# Patient Record
Sex: MALE | Race: WHITE
[De-identification: names, ages, dates, MRNs, and addresses within clinical notes are randomized per-mention and may not be internally consistent; named-entity substitution may affect disease eponyms.]

---

## 2018-03-27 ENCOUNTER — HOSPITAL ENCOUNTER (EMERGENCY)
Dept: HOSPITAL 84 - D.ER | Age: 43
Discharge: HOME | End: 2018-03-27
Payer: MEDICARE

## 2018-03-27 VITALS — BODY MASS INDEX: 32.2 KG/M2

## 2018-03-27 DIAGNOSIS — K21.9: ICD-10-CM

## 2018-03-27 DIAGNOSIS — R10.13: Primary | ICD-10-CM

## 2018-03-27 DIAGNOSIS — E11.9: ICD-10-CM

## 2018-03-27 LAB
ALBUMIN SERPL-MCNC: 3.9 G/DL (ref 3.4–5)
ALP SERPL-CCNC: 75 U/L (ref 46–116)
ALT SERPL-CCNC: 60 U/L (ref 10–68)
AMYLASE SERPL-CCNC: 60 U/L (ref 25–115)
ANION GAP SERPL CALC-SCNC: 14.5 MMOL/L (ref 8–16)
APPEARANCE UR: CLEAR
BASOPHILS NFR BLD AUTO: 0.8 % (ref 0–2)
BILIRUB SERPL-MCNC: 0.46 MG/DL (ref 0.2–1.3)
BILIRUB SERPL-MCNC: NEGATIVE MG/DL
BUN SERPL-MCNC: 12 MG/DL (ref 7–18)
CALCIUM SERPL-MCNC: 9.2 MG/DL (ref 8.5–10.1)
CHLORIDE SERPL-SCNC: 105 MMOL/L (ref 98–107)
CO2 SERPL-SCNC: 25.8 MMOL/L (ref 21–32)
COLOR UR: YELLOW
CREAT SERPL-MCNC: 0.9 MG/DL (ref 0.6–1.3)
EOSINOPHIL NFR BLD: 3.6 % (ref 0–7)
ERYTHROCYTE [DISTWIDTH] IN BLOOD BY AUTOMATED COUNT: 13 % (ref 11.5–14.5)
GLOBULIN SER-MCNC: 4 G/L
GLUCOSE SERPL-MCNC: 130 MG/DL (ref 74–106)
GLUCOSE SERPL-MCNC: NEGATIVE MG/DL
HCT VFR BLD CALC: 46.7 % (ref 42–54)
HGB BLD-MCNC: 16.4 G/DL (ref 13.5–17.5)
IMM GRANULOCYTES NFR BLD: 0.3 % (ref 0–5)
KETONES UR STRIP-MCNC: NEGATIVE MG/DL
LIPASE SERPL-CCNC: 275 U/L (ref 73–393)
LYMPHOCYTES NFR BLD AUTO: 28 % (ref 15–50)
MCH RBC QN AUTO: 29.4 PG (ref 26–34)
MCHC RBC AUTO-ENTMCNC: 35.1 G/DL (ref 31–37)
MCV RBC: 83.8 FL (ref 80–100)
MONOCYTES NFR BLD: 9.7 % (ref 2–11)
NEUTROPHILS NFR BLD AUTO: 57.6 % (ref 40–80)
NITRITE UR-MCNC: NEGATIVE MG/ML
OSMOLALITY SERPL CALC.SUM OF ELEC: 282 MOSM/KG (ref 275–300)
PH UR STRIP: 8 [PH] (ref 5–6)
PLATELET # BLD: 245 10X3/UL (ref 130–400)
PMV BLD AUTO: 10.6 FL (ref 7.4–10.4)
POTASSIUM SERPL-SCNC: 4.3 MMOL/L (ref 3.5–5.1)
PROT SERPL-MCNC: 7.9 G/DL (ref 6.4–8.2)
PROT UR-MCNC: NEGATIVE MG/DL
RBC # BLD AUTO: 5.57 10X6/UL (ref 4.2–6.1)
SODIUM SERPL-SCNC: 141 MMOL/L (ref 136–145)
SP GR UR STRIP: 1 (ref 1–1.02)
UROBILINOGEN UR-MCNC: NORMAL MG/DL
WBC # BLD AUTO: 11.5 10X3/UL (ref 4.8–10.8)

## 2018-11-30 ENCOUNTER — HOSPITAL ENCOUNTER (OUTPATIENT)
Dept: HOSPITAL 84 - D.MRI | Age: 43
Discharge: HOME | End: 2018-11-30
Attending: PAIN MEDICINE
Payer: MEDICARE

## 2018-11-30 VITALS — BODY MASS INDEX: 32.2 KG/M2

## 2018-11-30 DIAGNOSIS — M96.1: ICD-10-CM

## 2018-11-30 DIAGNOSIS — Z79.891: ICD-10-CM

## 2018-11-30 DIAGNOSIS — Z79.899: ICD-10-CM

## 2018-11-30 DIAGNOSIS — M47.897: ICD-10-CM

## 2018-11-30 DIAGNOSIS — M54.5: Primary | ICD-10-CM

## 2018-11-30 DIAGNOSIS — M54.17: ICD-10-CM

## 2019-04-07 ENCOUNTER — HOSPITAL ENCOUNTER (EMERGENCY)
Dept: HOSPITAL 84 - D.ER | Age: 44
Discharge: LEFT BEFORE DISCHARGE | End: 2019-04-07
Payer: MEDICARE

## 2019-04-07 VITALS
BODY MASS INDEX: 32.65 KG/M2 | HEIGHT: 69 IN | HEIGHT: 69 IN | WEIGHT: 220.46 LBS | WEIGHT: 220.46 LBS | BODY MASS INDEX: 32.65 KG/M2

## 2019-04-07 VITALS — DIASTOLIC BLOOD PRESSURE: 79 MMHG | SYSTOLIC BLOOD PRESSURE: 128 MMHG

## 2019-04-07 DIAGNOSIS — R10.13: Primary | ICD-10-CM

## 2019-04-07 DIAGNOSIS — E11.9: ICD-10-CM

## 2019-04-07 DIAGNOSIS — K21.9: ICD-10-CM

## 2019-04-07 LAB
ALBUMIN SERPL-MCNC: 3.8 G/DL (ref 3.4–5)
ALP SERPL-CCNC: 76 U/L (ref 46–116)
ALT SERPL-CCNC: 76 U/L (ref 10–68)
AMYLASE SERPL-CCNC: 40 U/L (ref 25–115)
ANION GAP SERPL CALC-SCNC: 15.7 MMOL/L (ref 8–16)
APPEARANCE UR: CLEAR
BASOPHILS NFR BLD AUTO: 0.5 % (ref 0–2)
BILIRUB SERPL-MCNC: 0.34 MG/DL (ref 0.2–1.3)
BILIRUB SERPL-MCNC: NEGATIVE MG/DL
BUN SERPL-MCNC: 12 MG/DL (ref 7–18)
CALCIUM SERPL-MCNC: 9.1 MG/DL (ref 8.5–10.1)
CHLORIDE SERPL-SCNC: 102 MMOL/L (ref 98–107)
CO2 SERPL-SCNC: 23.1 MMOL/L (ref 21–32)
COLOR UR: YELLOW
CREAT SERPL-MCNC: 1 MG/DL (ref 0.6–1.3)
EOSINOPHIL NFR BLD: 1 % (ref 0–7)
ERYTHROCYTE [DISTWIDTH] IN BLOOD BY AUTOMATED COUNT: 13.1 % (ref 11.5–14.5)
GLOBULIN SER-MCNC: 4.2 G/L
GLUCOSE SERPL-MCNC: 115 MG/DL (ref 74–106)
GLUCOSE SERPL-MCNC: NEGATIVE MG/DL
HCT VFR BLD CALC: 46 % (ref 42–54)
HGB BLD-MCNC: 16.5 G/DL (ref 13.5–17.5)
IMM GRANULOCYTES NFR BLD: 0.3 % (ref 0–5)
KETONES UR STRIP-MCNC: NEGATIVE MG/DL
LIPASE SERPL-CCNC: 228 U/L (ref 73–393)
LYMPHOCYTES NFR BLD AUTO: 20.4 % (ref 15–50)
MCH RBC QN AUTO: 30.2 PG (ref 26–34)
MCHC RBC AUTO-ENTMCNC: 35.9 G/DL (ref 31–37)
MCV RBC: 84.1 FL (ref 80–100)
MONOCYTES NFR BLD: 9.2 % (ref 2–11)
NEUTROPHILS NFR BLD AUTO: 68.6 % (ref 40–80)
NITRITE UR-MCNC: NEGATIVE MG/ML
OSMOLALITY SERPL CALC.SUM OF ELEC: 274 MOSM/KG (ref 275–300)
PH UR STRIP: 6 [PH] (ref 5–6)
PLATELET # BLD: 244 10X3/UL (ref 130–400)
PMV BLD AUTO: 10.8 FL (ref 7.4–10.4)
POTASSIUM SERPL-SCNC: 3.8 MMOL/L (ref 3.5–5.1)
PROT SERPL-MCNC: 8 G/DL (ref 6.4–8.2)
PROT UR-MCNC: (no result) MG/DL
RBC # BLD AUTO: 5.47 10X6/UL (ref 4.2–6.1)
SODIUM SERPL-SCNC: 137 MMOL/L (ref 136–145)
SP GR UR STRIP: 1.01 (ref 1–1.02)
UROBILINOGEN UR-MCNC: NORMAL MG/DL
WBC # BLD AUTO: 15.6 10X3/UL (ref 4.8–10.8)

## 2019-08-22 ENCOUNTER — HOSPITAL ENCOUNTER (OUTPATIENT)
Dept: HOSPITAL 84 - D.CT | Age: 44
Discharge: HOME | End: 2019-08-22
Attending: ORTHOPAEDIC SURGERY
Payer: MEDICARE

## 2019-08-22 VITALS — BODY MASS INDEX: 32.5 KG/M2

## 2019-08-22 DIAGNOSIS — M25.551: Primary | ICD-10-CM

## 2020-05-27 ENCOUNTER — HOSPITAL ENCOUNTER (OUTPATIENT)
Dept: HOSPITAL 84 - D.ER | Age: 45
Setting detail: OBSERVATION
Discharge: HOME | End: 2020-05-27
Attending: FAMILY MEDICINE | Admitting: FAMILY MEDICINE
Payer: MEDICARE

## 2020-05-27 VITALS
WEIGHT: 216.45 LBS | BODY MASS INDEX: 32.06 KG/M2 | BODY MASS INDEX: 32.06 KG/M2 | HEIGHT: 69 IN | HEIGHT: 69 IN | WEIGHT: 216.45 LBS

## 2020-05-27 VITALS — SYSTOLIC BLOOD PRESSURE: 119 MMHG | DIASTOLIC BLOOD PRESSURE: 74 MMHG

## 2020-05-27 VITALS — DIASTOLIC BLOOD PRESSURE: 79 MMHG | SYSTOLIC BLOOD PRESSURE: 132 MMHG

## 2020-05-27 VITALS — SYSTOLIC BLOOD PRESSURE: 105 MMHG | DIASTOLIC BLOOD PRESSURE: 54 MMHG

## 2020-05-27 DIAGNOSIS — F17.203: ICD-10-CM

## 2020-05-27 DIAGNOSIS — E87.6: ICD-10-CM

## 2020-05-27 DIAGNOSIS — I20.0: Primary | ICD-10-CM

## 2020-05-27 DIAGNOSIS — K21.9: ICD-10-CM

## 2020-05-27 DIAGNOSIS — E78.5: ICD-10-CM

## 2020-05-27 DIAGNOSIS — I24.9: ICD-10-CM

## 2020-05-27 DIAGNOSIS — R42: ICD-10-CM

## 2020-05-27 DIAGNOSIS — R55: ICD-10-CM

## 2020-05-27 DIAGNOSIS — E11.65: ICD-10-CM

## 2020-05-27 DIAGNOSIS — E87.1: ICD-10-CM

## 2020-05-27 DIAGNOSIS — G89.29: ICD-10-CM

## 2020-05-27 DIAGNOSIS — I10: ICD-10-CM

## 2020-05-27 LAB
ALBUMIN SERPL-MCNC: 3.5 G/DL (ref 3.4–5)
ALP SERPL-CCNC: 63 U/L (ref 30–120)
ALT SERPL-CCNC: 75 U/L (ref 10–68)
ALT SERPL-CCNC: 81 U/L (ref 10–68)
ANION GAP SERPL CALC-SCNC: 12.6 MMOL/L (ref 8–16)
ANION GAP SERPL CALC-SCNC: 15.3 MMOL/L (ref 8–16)
APTT BLD: 25.8 SECONDS (ref 22.8–39.4)
BASOPHILS NFR BLD AUTO: 0.4 % (ref 0–2)
BASOPHILS NFR BLD AUTO: 0.5 % (ref 0–2)
BILIRUB SERPL-MCNC: 0.24 MG/DL (ref 0.2–1.3)
BUN SERPL-MCNC: 11 MG/DL (ref 7–18)
BUN SERPL-MCNC: 12 MG/DL (ref 7–18)
CALCIUM SERPL-MCNC: 8.8 MG/DL (ref 8.5–10.1)
CALCIUM SERPL-MCNC: 8.9 MG/DL (ref 8.5–10.1)
CHLORIDE SERPL-SCNC: 100 MMOL/L (ref 98–107)
CHLORIDE SERPL-SCNC: 104 MMOL/L (ref 98–107)
CHOLEST/HDLC SERPL: 7.9 RATIO (ref 2.3–4.9)
CK MB SERPL-MCNC: 0.2 U/L (ref 0–3.6)
CK MB SERPL-MCNC: 0.2 U/L (ref 0–3.6)
CK SERPL-CCNC: 32 UL (ref 21–232)
CK SERPL-CCNC: 33 UL (ref 21–232)
CO2 SERPL-SCNC: 23 MMOL/L (ref 21–32)
CO2 SERPL-SCNC: 26 MMOL/L (ref 21–32)
CREAT SERPL-MCNC: 0.8 MG/DL (ref 0.6–1.3)
CREAT SERPL-MCNC: 1 MG/DL (ref 0.6–1.3)
EOSINOPHIL NFR BLD: 1.4 % (ref 0–7)
EOSINOPHIL NFR BLD: 2.1 % (ref 0–7)
ERYTHROCYTE [DISTWIDTH] IN BLOOD BY AUTOMATED COUNT: 12.9 % (ref 11.5–14.5)
ERYTHROCYTE [DISTWIDTH] IN BLOOD BY AUTOMATED COUNT: 12.9 % (ref 11.5–14.5)
GLOBULIN SER-MCNC: 3.6 G/L
GLUCOSE SERPL-MCNC: 167 MG/DL (ref 74–106)
GLUCOSE SERPL-MCNC: 223 MG/DL (ref 74–106)
HCT VFR BLD CALC: 46.3 % (ref 42–54)
HCT VFR BLD CALC: 46.4 % (ref 42–54)
HDLC SERPL-MCNC: 27 MG/DL (ref 32–96)
HGB BLD-MCNC: 16 G/DL (ref 13.5–17.5)
HGB BLD-MCNC: 16.1 G/DL (ref 13.5–17.5)
IMM GRANULOCYTES NFR BLD: 0.6 % (ref 0–5)
IMM GRANULOCYTES NFR BLD: 0.8 % (ref 0–5)
INR PPP: 0.93 (ref 0.85–1.17)
LDL-HDL RATIO: 4.4 RATIO (ref 1.5–3.5)
LDLC SERPL-MCNC: 120 MG/DL (ref 0–100)
LYMPHOCYTES NFR BLD AUTO: 23.6 % (ref 15–50)
LYMPHOCYTES NFR BLD AUTO: 31 % (ref 15–50)
MAGNESIUM SERPL-MCNC: 1.7 MG/DL (ref 1.8–2.4)
MAGNESIUM SERPL-MCNC: 1.9 MG/DL (ref 1.8–2.4)
MCH RBC QN AUTO: 29.7 PG (ref 26–34)
MCH RBC QN AUTO: 29.8 PG (ref 26–34)
MCHC RBC AUTO-ENTMCNC: 34.6 G/DL (ref 31–37)
MCHC RBC AUTO-ENTMCNC: 34.7 G/DL (ref 31–37)
MCV RBC: 85.8 FL (ref 80–100)
MCV RBC: 86.1 FL (ref 80–100)
MONOCYTES NFR BLD: 7.3 % (ref 2–11)
MONOCYTES NFR BLD: 8.1 % (ref 2–11)
NEUTROPHILS NFR BLD AUTO: 57.5 % (ref 40–80)
NEUTROPHILS NFR BLD AUTO: 66.7 % (ref 40–80)
OSMOLALITY SERPL CALC.SUM OF ELEC: 275 MOSM/KG (ref 275–300)
OSMOLALITY SERPL CALC.SUM OF ELEC: 281 MOSM/KG (ref 275–300)
PLATELET # BLD: 226 10X3/UL (ref 130–400)
PLATELET # BLD: 245 10X3/UL (ref 130–400)
PMV BLD AUTO: 10.6 FL (ref 7.4–10.4)
PMV BLD AUTO: 10.8 FL (ref 7.4–10.4)
POTASSIUM SERPL-SCNC: 3.3 MMOL/L (ref 3.5–5.1)
POTASSIUM SERPL-SCNC: 3.6 MMOL/L (ref 3.5–5.1)
PROT SERPL-MCNC: 7.1 G/DL (ref 6.4–8.2)
PROTHROMBIN TIME: 12.4 SECONDS (ref 11.6–15)
RBC # BLD AUTO: 5.38 10X6/UL (ref 4.2–6.1)
RBC # BLD AUTO: 5.41 10X6/UL (ref 4.2–6.1)
SODIUM SERPL-SCNC: 135 MMOL/L (ref 136–145)
SODIUM SERPL-SCNC: 139 MMOL/L (ref 136–145)
TRIGL SERPL-MCNC: 332 MG/DL (ref 30–200)
TROPONIN I SERPL-MCNC: < 0.017 NG/ML (ref 0–0.06)
TROPONIN I SERPL-MCNC: < 0.017 NG/ML (ref 0–0.06)
WBC # BLD AUTO: 15 10X3/UL (ref 4.8–10.8)
WBC # BLD AUTO: 16.2 10X3/UL (ref 4.8–10.8)

## 2020-05-28 NOTE — EC
PATIENT:NAVEEN GUTIERREZ JR             DATE OF SERVICE: 05/27/20
SEX: M                                  MEDICAL RECORD: [id number]
DATE OF BIRTH: [birth year]                        LOCATION:D.M2      D.212
AGE OF PATIENT: 45                             ADMISSION DATE: 05/27/20
 
REFERRING PHYSICIAN:                               
 
INTERPRETING PHYSICIAN: CLARISSA PATTON MD          
 
 
 
                             ECHOCARDIOGRAM REPORT
  ECHO CHARGES 4               ECHO COMPLETE                 Date: 05/27/20
 
 
 
CLINICAL DIAGNOSIS: CP                            
 
                         ECHOCARDIOGRAPHIC MEASUREMENTS
      (adult normal given)
   AC root (d.<3.7cm) 2.6  cm   LV Septum d (<1.2 cm> 0.6  cm
      Valve Excursion 1.9  cm     LV Septum (systole) 0.7  cm
Left Atria (s.<4.0cm> 4.1  cm          LVPW d(<1.2cm) 0.7  cm
        RV (d.<2.3cm) 2.9  cm           LVPW (sytole) 1.0  cm
  LV diastole(<5.6CM) 6.1  cm       MV E-F(>70mm/sec)      cm
           LV systole 5.2  cm           LVOT Diameter 1.6  cm
       MV exc.(>10mm)      cm
Est.ejection fraction (50-75%)     %
 
   DOPPLER:
     LVIT      cm/sec A 46   cm/sec E 74    cm/sec
       LA      cm/sec      RVSP 15.3 mmHg
     LVOT 100  cm/sec   AOP1/2T      m/s
  Asc. Ao 130  cm/sec
     RVOT 89   cm/sec
       RA      cm/sec
       PA 97   cm/sec
 AV Gradient Peak 6.8  mmHg  AV Mean 3.7  mmHg  AV Area 1.5  cm
 MV Gradient Peak 2.4  mmHg  MV Mean 1.3  mmHg  MV Area      cm
   COMMENTS:                                              
 
 
 Cardiac Sonographer: 5               ABIODUN DOWNS             
      Cardiologist: 3          Dr. Woods             
             TAPE# PACS           
                                       Pericardial Effusion Y                        
 
 
DATE OF SERVICE:  
 
Adequate 2D, color flow imaging, spectral Doppler, and M-Mode.
 
No LVH.  LV internal dimension is normal.  Wall motion is normal.  EF is greater
than or equal to 55%.  Aortic valve is tricuspid.  No evidence of stenosis by
Doppler interrogation.  Left atrium is upper limits of normal and mildly dilated
at 4.1 cm.  Mitral valve shows no prolapse.  Trace MR.  Right-sided chambers are
grossly normal.  Trace TR.
 
 
 
 
ECHOCARDIOGRAM REPORT                          [id number]    NAVEEN GUTIERREZ      
 
 
TRANSINT:[id number] Voice Confirmation ID: [id number] DOCUMENT ID: [id number]
                                           
                                           CLARISSA PATTON MD          
 
 
 
Electronically Signed by CLARISSA PATTON on 05/28/20 at 0817
 
 
 
 
 
 
 
 
 
 
 
 
 
 
 
 
 
 
 
 
 
 
 
 
 
 
 
 
 
 
 
 
 
 
 
 
 
 
 
CC:                                                             [id number]
DICTATION DATE: 05/27/20 1225     :     05/27/20 1411      DIS IN  
                                                                      05/27/20
[id number] [place] [id number]

## 2020-05-28 NOTE — CN
PATIENT NAME:NAVEEN GUTIERREZ JR                           MEDICAL RECORD: [id number]
: 79                                              LOCATION:D. D.2120
ADMIT DATE: 20                                       ACCOUNT: [id number]
CONSULTING PHYSICIAN:    CLARISSA PATTON MD          
                                               
REFERRING PHYSICIAN:     RODRIGO SALGUERO MD           
 
 
DATE OF CONSULTATION:  2020
 
HISTORY OF PRESENT ILLNESS:  A 45-year-old gentleman with no known history of
coronary artery disease, he has a history of diabetes mellitus, hypertension,
hyperlipidemia, ongoing tobacco use, admitted with chest pain.  He has a history
of reflux, but this is somewhat different from his typical reflux, did not
respond to a GI cocktail, relieved with two nitroglycerin, noticed decreased
effort tolerance over the past 4-6 weeks.  Last night was first rest symptoms. 
We are asked to see him concerning his cardiovascular status.
 
PAST MEDICAL HISTORY:  Includes;
1.  History of hypertension.
2.  Hyperlipidemia.
3.  Diabetes mellitus.
4.  Anxiety.
 
ALLERGIES:  INCLUDE CONTRAST, INTOLERANT TO STATINS WITH MYALGIAS.
 
MEDICATIONS:  Typically include Norco 10/325 one p.o. every 4 hours p.r.n.,
Naprosyn 500 b.i.d., clonazepam 1 mg p.o. b.i.d., metformin 1 gram b.i.d.,
Januvia 50 mg p.o. daily, vitamin D 1000 units daily.
 
SOCIAL HISTORY:  He smokes about a pack a day, nondrinker, no illicit drug use. 
Rare exercise secondary to back issues.
 
REVIEW OF SYSTEMS:  The patient reports easy bruising but reports no swollen
glands. The patient reports no fever, no night sweats, no significant weight
gain, no significant weight loss.  No significant exercise tolerance.  The
patient reports no dry eyes, no irritation, no vision change.  Patient reports
no difficulty hearing and no ear pain.  Patient reports no frequent nose bleeds
or nose and sinus problems.  Patient reports on arm pain on exertion.   No
shortness of breath while lying down.  No history of heart murmur.  Patient
reports no cough, no wheezing or coughing up blood.  Patient reports no
abdominal pain, no vomiting.  Normal appetite.  No diarrhea and not vomiting
blood.  No nausea and no constipation.  Patient reports no incontinence.  No
difficulty urinating.  No hematuria.  No increased frequency.  Patient reports
no muscle aches.  No weakness, no arthralgias, no back pain.  No swelling of the
extremities.  Patient reports no abnormal mole, no jaundice, no rashes.  Reports
no loss of consciousness.  No weakness and no numbness.  No seizures, dizziness,
or headaches.  The patient reports no depression, no sleep disturbance, feeling
safe in a relationship and no alcohol abuse.  Patient reports on fatigue. 
Reports no runny nose or sinus pressure.  No itching, no hives, and no frequent
sneezing.
 
PHYSICAL EXAMINATION:
GENERAL:  Well-developed, in no acute distress.
VITAL SIGNS:  Blood pressure 123/20, pulse 87.
HEENT:  Normocephalic, atraumatic.
NECK:  No JVD or bruit.
HEART:  Regular.  A II/VI systolic ejection murmur.
 
 
 
CONSULT REPORT                                 [id number]    NAVEEN GUTIERREZ JR         
 
 
LUNGS:  Good air excursion.
ABDOMEN:  Soft, nontender.
EXTREMITIES:  Pulses 2+.  No edema.
 
DIAGNOSTIC DATA:  EKG shows minor nonspecific ST-T changes inferolaterally
probable voltage for LVH.
 
IMPRESSION:  Acute coronary syndrome, rapidly progressing symptomology, multiple
risk factors, angiography, intervention based on above.
 
TRANSINT:[id number] Voice Confirmation ID: [id number] DOCUMENT ID: [id number]
                                           
                                           CLARISSA PATTON MD          
 
 
 
Electronically Signed by CLARISSA PATTON on 20 at 0817
 
 
 
 
 
 
 
 
 
 
 
 
 
 
 
 
 
 
 
 
 
 
 
 
 
 
 
 
 
 
CC:                                                             [id number]
DICTATION DATE: 20     :     20 1612      DIS IN  
                                                                      20
[id number] [place] [address]

## 2020-06-10 ENCOUNTER — HOSPITAL ENCOUNTER (EMERGENCY)
Dept: HOSPITAL 84 - D.ER | Age: 45
LOS: 1 days | Discharge: HOME | End: 2020-06-11
Payer: MEDICARE

## 2020-06-10 VITALS — HEIGHT: 69 IN | BODY MASS INDEX: 33.39 KG/M2 | WEIGHT: 225.47 LBS

## 2020-06-10 DIAGNOSIS — R42: ICD-10-CM

## 2020-06-10 DIAGNOSIS — N17.9: ICD-10-CM

## 2020-06-10 DIAGNOSIS — F41.9: ICD-10-CM

## 2020-06-10 DIAGNOSIS — Z79.84: ICD-10-CM

## 2020-06-10 DIAGNOSIS — E11.9: ICD-10-CM

## 2020-06-10 DIAGNOSIS — K21.9: ICD-10-CM

## 2020-06-10 DIAGNOSIS — R41.0: Primary | ICD-10-CM

## 2020-06-10 DIAGNOSIS — D72.829: ICD-10-CM

## 2020-06-10 LAB
ALBUMIN SERPL-MCNC: 3.7 G/DL (ref 3.4–5)
ALP SERPL-CCNC: 66 U/L (ref 30–120)
ALT SERPL-CCNC: 85 U/L (ref 10–68)
AMPHETAMINES UR QL SCN: NEGATIVE QUAL
ANION GAP SERPL CALC-SCNC: 11.8 MMOL/L (ref 8–16)
APTT BLD: 28.5 SECONDS (ref 22.8–39.4)
BACTERIA #/AREA URNS HPF: (no result) /HPF
BARBITURATES UR QL SCN: NEGATIVE QUAL
BASOPHILS NFR BLD AUTO: 0.4 % (ref 0–2)
BENZODIAZ UR QL SCN: NEGATIVE QUAL
BILIRUB SERPL-MCNC: 0.35 MG/DL (ref 0.2–1.3)
BILIRUB SERPL-MCNC: NEGATIVE MG/DL
BUN SERPL-MCNC: 7 MG/DL (ref 7–18)
BZE UR QL SCN: NEGATIVE QUAL
CALCIUM SERPL-MCNC: 8.3 MG/DL (ref 8.5–10.1)
CANNABINOIDS UR QL SCN: NEGATIVE QUAL
CHLORIDE SERPL-SCNC: 104 MMOL/L (ref 98–107)
CK MB SERPL-MCNC: 0.3 U/L (ref 0–3.6)
CK SERPL-CCNC: 49 UL (ref 21–232)
CO2 SERPL-SCNC: 25.5 MMOL/L (ref 21–32)
CREAT SERPL-MCNC: 1.4 MG/DL (ref 0.6–1.3)
EOSINOPHIL NFR BLD: 2 % (ref 0–7)
ERYTHROCYTE [DISTWIDTH] IN BLOOD BY AUTOMATED COUNT: 12.6 % (ref 11.5–14.5)
GLOBULIN SER-MCNC: 3.6 G/L
GLUCOSE SERPL-MCNC: 159 MG/DL (ref 74–106)
GLUCOSE SERPL-MCNC: NEGATIVE MG/DL
HCT VFR BLD CALC: 46.5 % (ref 42–54)
HGB BLD-MCNC: 15.9 G/DL (ref 13.5–17.5)
IMM GRANULOCYTES NFR BLD: 0.3 % (ref 0–5)
INR PPP: 0.94 (ref 0.85–1.17)
KETONES UR STRIP-MCNC: NEGATIVE MG/DL
LYMPHOCYTES NFR BLD AUTO: 20.3 % (ref 15–50)
MAGNESIUM SERPL-MCNC: 1.8 MG/DL (ref 1.8–2.4)
MCH RBC QN AUTO: 30.3 PG (ref 26–34)
MCHC RBC AUTO-ENTMCNC: 34.2 G/DL (ref 31–37)
MCV RBC: 88.6 FL (ref 80–100)
MONOCYTES NFR BLD: 8.2 % (ref 2–11)
NEUTROPHILS NFR BLD AUTO: 68.8 % (ref 40–80)
NITRITE UR-MCNC: NEGATIVE MG/ML
OPIATES UR QL SCN: POSITIVE QUAL
OSMOLALITY SERPL CALC.SUM OF ELEC: 274 MOSM/KG (ref 275–300)
PCP UR QL SCN: NEGATIVE QUAL
PH UR STRIP: 7 [PH] (ref 5–6)
PLATELET # BLD: 241 10X3/UL (ref 130–400)
PMV BLD AUTO: 10.5 FL (ref 7.4–10.4)
POTASSIUM SERPL-SCNC: 4.3 MMOL/L (ref 3.5–5.1)
PROT SERPL-MCNC: 7.3 G/DL (ref 6.4–8.2)
PROTHROMBIN TIME: 12.5 SECONDS (ref 11.6–15)
RBC # BLD AUTO: 5.25 10X6/UL (ref 4.2–6.1)
RBC #/AREA URNS HPF: (no result) /HPF (ref 0–5)
SODIUM SERPL-SCNC: 137 MMOL/L (ref 136–145)
SP GR UR STRIP: 1.01 (ref 1–1.02)
SQUAMOUS #/AREA URNS HPF: (no result) /HPF (ref 0–5)
TROPONIN I SERPL-MCNC: < 0.017 NG/ML (ref 0–0.06)
TSH SERPL-ACNC: 2.08 UIU/ML (ref 0.36–3.74)
UROBILINOGEN UR-MCNC: NORMAL MG/DL
WBC # BLD AUTO: 14.6 10X3/UL (ref 4.8–10.8)
WBC #/AREA URNS HPF: (no result) /HPF

## 2020-06-11 ENCOUNTER — HOSPITAL ENCOUNTER (OUTPATIENT)
Dept: HOSPITAL 84 - D.ER | Age: 45
Setting detail: OBSERVATION
LOS: 1 days | Discharge: HOME | End: 2020-06-12
Attending: FAMILY MEDICINE | Admitting: FAMILY MEDICINE
Payer: MEDICARE

## 2020-06-11 VITALS — DIASTOLIC BLOOD PRESSURE: 70 MMHG | SYSTOLIC BLOOD PRESSURE: 129 MMHG

## 2020-06-11 VITALS — SYSTOLIC BLOOD PRESSURE: 119 MMHG | DIASTOLIC BLOOD PRESSURE: 74 MMHG

## 2020-06-11 VITALS — WEIGHT: 211.44 LBS | HEIGHT: 69 IN | BODY MASS INDEX: 31.32 KG/M2 | BODY MASS INDEX: 31.32 KG/M2

## 2020-06-11 VITALS — DIASTOLIC BLOOD PRESSURE: 74 MMHG | SYSTOLIC BLOOD PRESSURE: 119 MMHG

## 2020-06-11 DIAGNOSIS — E78.5: ICD-10-CM

## 2020-06-11 DIAGNOSIS — R20.2: ICD-10-CM

## 2020-06-11 DIAGNOSIS — G89.29: ICD-10-CM

## 2020-06-11 DIAGNOSIS — F17.203: ICD-10-CM

## 2020-06-11 DIAGNOSIS — I20.0: Primary | ICD-10-CM

## 2020-06-11 DIAGNOSIS — E11.65: ICD-10-CM

## 2020-06-11 DIAGNOSIS — K21.9: ICD-10-CM

## 2020-06-11 DIAGNOSIS — F41.8: ICD-10-CM

## 2020-06-11 LAB
ALBUMIN SERPL-MCNC: 3.9 G/DL (ref 3.4–5)
ALP SERPL-CCNC: 73 U/L (ref 30–120)
ALT SERPL-CCNC: 82 U/L (ref 10–68)
ANION GAP SERPL CALC-SCNC: 10 MMOL/L (ref 8–16)
APTT BLD: 28.6 SECONDS (ref 22.8–39.4)
BASOPHILS NFR BLD AUTO: 0.7 % (ref 0–2)
BILIRUB SERPL-MCNC: 0.43 MG/DL (ref 0.2–1.3)
BUN SERPL-MCNC: 6 MG/DL (ref 7–18)
CALCIUM SERPL-MCNC: 9.7 MG/DL (ref 8.5–10.1)
CHLORIDE SERPL-SCNC: 105 MMOL/L (ref 98–107)
CHOLEST/HDLC SERPL: 6.1 RATIO (ref 2.3–4.9)
CK MB SERPL-MCNC: 0.3 U/L (ref 0–3.6)
CK MB SERPL-MCNC: 0.6 U/L (ref 0–3.6)
CK SERPL-CCNC: 40 UL (ref 21–232)
CK SERPL-CCNC: 43 UL (ref 21–232)
CO2 SERPL-SCNC: 26.3 MMOL/L (ref 21–32)
CREAT SERPL-MCNC: 1 MG/DL (ref 0.6–1.3)
CRP SERPL-MCNC: 1.6 MG/DL (ref 0–0.9)
EOSINOPHIL NFR BLD: 1.3 % (ref 0–7)
ERYTHROCYTE [DISTWIDTH] IN BLOOD BY AUTOMATED COUNT: 12.4 % (ref 11.5–14.5)
GLOBULIN SER-MCNC: 3.6 G/L
GLUCOSE SERPL-MCNC: 134 MG/DL (ref 74–106)
HCT VFR BLD CALC: 47.6 % (ref 42–54)
HDLC SERPL-MCNC: 28 MG/DL (ref 32–96)
HGB BLD-MCNC: 16.3 G/DL (ref 13.5–17.5)
IMM GRANULOCYTES NFR BLD: 0.4 % (ref 0–5)
INR PPP: 0.96 (ref 0.85–1.17)
LDL-HDL RATIO: 2.9 RATIO (ref 1.5–3.5)
LDLC SERPL-MCNC: 80 MG/DL (ref 0–100)
LYMPHOCYTES NFR BLD AUTO: 23.8 % (ref 15–50)
MCH RBC QN AUTO: 29.8 PG (ref 26–34)
MCHC RBC AUTO-ENTMCNC: 34.2 G/DL (ref 31–37)
MCV RBC: 87 FL (ref 80–100)
MONOCYTES NFR BLD: 8.5 % (ref 2–11)
NEUTROPHILS NFR BLD AUTO: 65.3 % (ref 40–80)
OSMOLALITY SERPL CALC.SUM OF ELEC: 273 MOSM/KG (ref 275–300)
PLATELET # BLD: 252 10X3/UL (ref 130–400)
PMV BLD AUTO: 10.2 FL (ref 7.4–10.4)
POTASSIUM SERPL-SCNC: 4.3 MMOL/L (ref 3.5–5.1)
PROT SERPL-MCNC: 7.5 G/DL (ref 6.4–8.2)
PROTHROMBIN TIME: 12.8 SECONDS (ref 11.6–15)
RBC # BLD AUTO: 5.47 10X6/UL (ref 4.2–6.1)
SODIUM SERPL-SCNC: 137 MMOL/L (ref 136–145)
TRIGL SERPL-MCNC: 319 MG/DL (ref 30–200)
TROPONIN I SERPL-MCNC: < 0.017 NG/ML (ref 0–0.06)
TROPONIN I SERPL-MCNC: < 0.017 NG/ML (ref 0–0.06)
WBC # BLD AUTO: 10.3 10X3/UL (ref 4.8–10.8)

## 2020-06-12 VITALS — DIASTOLIC BLOOD PRESSURE: 68 MMHG | SYSTOLIC BLOOD PRESSURE: 116 MMHG

## 2020-06-12 VITALS — SYSTOLIC BLOOD PRESSURE: 111 MMHG | DIASTOLIC BLOOD PRESSURE: 60 MMHG

## 2020-06-12 VITALS — DIASTOLIC BLOOD PRESSURE: 69 MMHG | SYSTOLIC BLOOD PRESSURE: 122 MMHG

## 2020-06-12 VITALS — DIASTOLIC BLOOD PRESSURE: 66 MMHG | SYSTOLIC BLOOD PRESSURE: 111 MMHG

## 2020-06-12 LAB
ALBUMIN SERPL-MCNC: 3.5 G/DL (ref 3.4–5)
ALP SERPL-CCNC: 65 U/L (ref 30–120)
ALT SERPL-CCNC: 72 U/L (ref 10–68)
ANION GAP SERPL CALC-SCNC: 10.4 MMOL/L (ref 8–16)
BASOPHILS NFR BLD AUTO: 0.6 % (ref 0–2)
BILIRUB SERPL-MCNC: 0.28 MG/DL (ref 0.2–1.3)
BUN SERPL-MCNC: 11 MG/DL (ref 7–18)
CALCIUM SERPL-MCNC: 8.6 MG/DL (ref 8.5–10.1)
CHLORIDE SERPL-SCNC: 104 MMOL/L (ref 98–107)
CK MB SERPL-MCNC: 0.5 U/L (ref 0–3.6)
CK SERPL-CCNC: 41 UL (ref 21–232)
CK SERPL-CCNC: 43 UL (ref 21–232)
CO2 SERPL-SCNC: 26.3 MMOL/L (ref 21–32)
CREAT SERPL-MCNC: 1 MG/DL (ref 0.6–1.3)
EOSINOPHIL NFR BLD: 3 % (ref 0–7)
ERYTHROCYTE [DISTWIDTH] IN BLOOD BY AUTOMATED COUNT: 12.6 % (ref 11.5–14.5)
GLOBULIN SER-MCNC: 3.5 G/L
GLUCOSE SERPL-MCNC: 146 MG/DL (ref 74–106)
HCT VFR BLD CALC: 44.9 % (ref 42–54)
HGB BLD-MCNC: 14.8 G/DL (ref 13.5–17.5)
IMM GRANULOCYTES NFR BLD: 0.3 % (ref 0–5)
LYMPHOCYTES NFR BLD AUTO: 38.1 % (ref 15–50)
MAGNESIUM SERPL-MCNC: 2 MG/DL (ref 1.8–2.4)
MCH RBC QN AUTO: 29.1 PG (ref 26–34)
MCHC RBC AUTO-ENTMCNC: 33 G/DL (ref 31–37)
MCV RBC: 88.2 FL (ref 80–100)
MONOCYTES NFR BLD: 9.9 % (ref 2–11)
NEUTROPHILS NFR BLD AUTO: 48.1 % (ref 40–80)
OSMOLALITY SERPL CALC.SUM OF ELEC: 275 MOSM/KG (ref 275–300)
PLATELET # BLD: 267 10X3/UL (ref 130–400)
PMV BLD AUTO: 10.9 FL (ref 7.4–10.4)
POTASSIUM SERPL-SCNC: 3.7 MMOL/L (ref 3.5–5.1)
PROT SERPL-MCNC: 7 G/DL (ref 6.4–8.2)
RBC # BLD AUTO: 5.09 10X6/UL (ref 4.2–6.1)
SODIUM SERPL-SCNC: 137 MMOL/L (ref 136–145)
TROPONIN I SERPL-MCNC: < 0.017 NG/ML (ref 0–0.06)
TROPONIN I SERPL-MCNC: < 0.017 NG/ML (ref 0–0.06)
WBC # BLD AUTO: 10.2 10X3/UL (ref 4.8–10.8)

## 2020-06-12 NOTE — NUR
DISCHARGED PATIENT HOME WITH WIFE VIA WHEELCHAIR. DISCONTINUED IV, CATHETER
TIP INTACT. WENT OVER DISCHARGE INSTRUCTIONS WITH PATIENT AND WIFE, VERBALIZED
UNDERSTANDING. DENIES ANYTHING FURTHER AT THIS TIME.

## 2020-06-12 NOTE — NUR
ALERT AND ORIENTED. NO C/O PAIN. NO S/S OF ACUTE DISTRESS NOTED. ON TELEMETRY,
87 SR. IV TO RIGHT AC, SL. SITE PATENT WITHOUT REDNESS OR SWELLING. SPOUSE AT
BEDSIDE. DENIES ANY NEEDS AT THIS TIME. CALL LIGHT IN REACH. WILL CONTINUE TO
MONITOR.

## 2020-06-12 NOTE — NUR
ASSESSED AT THE BEGINNING OF THE SHIFT WHEN PT CAME FROM THE ER. HE IS ALERT
AND ORIENTED, ABLE TO VERBALZIE NEEDS. WIFE BROUGHT THEM BOTH DINNER AND AT
MIDNIGHT HE WAS PLACAE ON NPO AS ORDERED. DURING THE NIGHT AT ABOUT 0200 HE
CALLED FOR PAIN MEDS AND WANTED A GI COCKTAIL. HE WAS C/O CHEST PAIN AND
NUMBNESS TO HIS ARM AND JAW. MD WAS CALLED AND ORDERS RECEIVED FOR MORPHINE
AND THE GI COCKTAIL. THEY HAVE BOTH BEEN TAKEN AND HE WILL LET US KNOW IF THE
GI COCKTAIL HELPS BETTER THAN THE MORPHINE. HE HAD AN ORDER FOR 1-4 OF THE
MORPHINE. 2 MG WAS GIVEN.

## 2020-07-18 ENCOUNTER — HOSPITAL ENCOUNTER (EMERGENCY)
Dept: HOSPITAL 84 - D.ER | Age: 45
Discharge: HOME | End: 2020-07-18
Payer: MEDICARE

## 2020-07-18 VITALS — SYSTOLIC BLOOD PRESSURE: 151 MMHG | DIASTOLIC BLOOD PRESSURE: 79 MMHG

## 2020-07-18 VITALS
BODY MASS INDEX: 32.58 KG/M2 | WEIGHT: 220 LBS | WEIGHT: 220 LBS | HEIGHT: 69 IN | HEIGHT: 69 IN | BODY MASS INDEX: 32.58 KG/M2

## 2020-07-18 DIAGNOSIS — M26.629: Primary | ICD-10-CM

## 2020-07-18 DIAGNOSIS — I10: ICD-10-CM

## 2020-07-18 DIAGNOSIS — E11.9: ICD-10-CM

## 2020-07-18 DIAGNOSIS — Z79.84: ICD-10-CM

## 2020-07-18 DIAGNOSIS — M54.2: ICD-10-CM

## 2020-07-18 LAB
ALBUMIN SERPL-MCNC: 3.6 G/DL (ref 3.4–5)
ALP SERPL-CCNC: 63 U/L (ref 30–120)
ALT SERPL-CCNC: 64 U/L (ref 10–68)
ANION GAP SERPL CALC-SCNC: 11.4 MMOL/L (ref 8–16)
BASOPHILS NFR BLD AUTO: 0.6 % (ref 0–2)
BILIRUB SERPL-MCNC: 0.53 MG/DL (ref 0.2–1.3)
BUN SERPL-MCNC: 9 MG/DL (ref 7–18)
CALCIUM SERPL-MCNC: 8.9 MG/DL (ref 8.5–10.1)
CHLORIDE SERPL-SCNC: 104 MMOL/L (ref 98–107)
CK SERPL-CCNC: 45 UL (ref 21–232)
CO2 SERPL-SCNC: 25.2 MMOL/L (ref 21–32)
CREAT SERPL-MCNC: 1.3 MG/DL (ref 0.6–1.3)
CRP SERPL-MCNC: 1.2 MG/DL (ref 0–0.9)
EOSINOPHIL NFR BLD: 3.3 % (ref 0–7)
ERYTHROCYTE [DISTWIDTH] IN BLOOD BY AUTOMATED COUNT: 12.6 % (ref 11.5–14.5)
GLOBULIN SER-MCNC: 3.4 G/L
GLUCOSE SERPL-MCNC: 162 MG/DL (ref 74–106)
HCT VFR BLD CALC: 41.8 % (ref 42–54)
HGB BLD-MCNC: 14.6 G/DL (ref 13.5–17.5)
IMM GRANULOCYTES NFR BLD: 0.3 % (ref 0–5)
LYMPHOCYTES NFR BLD AUTO: 40.6 % (ref 15–50)
MCH RBC QN AUTO: 29.9 PG (ref 26–34)
MCHC RBC AUTO-ENTMCNC: 34.9 G/DL (ref 31–37)
MCV RBC: 85.7 FL (ref 80–100)
MONOCYTES NFR BLD: 8.3 % (ref 2–11)
NEUTROPHILS NFR BLD AUTO: 46.9 % (ref 40–80)
OSMOLALITY SERPL CALC.SUM OF ELEC: 276 MOSM/KG (ref 275–300)
PLATELET # BLD: 232 10X3/UL (ref 130–400)
PMV BLD AUTO: 9.8 FL (ref 7.4–10.4)
POTASSIUM SERPL-SCNC: 3.6 MMOL/L (ref 3.5–5.1)
PROT SERPL-MCNC: 7 G/DL (ref 6.4–8.2)
RBC # BLD AUTO: 4.88 10X6/UL (ref 4.2–6.1)
SODIUM SERPL-SCNC: 137 MMOL/L (ref 136–145)
TROPONIN I SERPL-MCNC: < 0.017 NG/ML (ref 0–0.06)
TSH SERPL-ACNC: 5.32 UIU/ML (ref 0.36–3.74)
WBC # BLD AUTO: 10.9 10X3/UL (ref 4.8–10.8)